# Patient Record
Sex: MALE | Race: AMERICAN INDIAN OR ALASKA NATIVE | URBAN - METROPOLITAN AREA
[De-identification: names, ages, dates, MRNs, and addresses within clinical notes are randomized per-mention and may not be internally consistent; named-entity substitution may affect disease eponyms.]

---

## 2017-06-28 ENCOUNTER — ALLIED HEALTH/NURSE VISIT (OUTPATIENT)
Dept: NEUROLOGY | Facility: CLINIC | Age: 52
End: 2017-06-28

## 2017-06-28 ENCOUNTER — OFFICE VISIT (OUTPATIENT)
Dept: NEUROLOGY | Facility: CLINIC | Age: 52
End: 2017-06-28

## 2017-06-28 VITALS — DIASTOLIC BLOOD PRESSURE: 89 MMHG | HEART RATE: 79 BPM | SYSTOLIC BLOOD PRESSURE: 143 MMHG

## 2017-06-28 DIAGNOSIS — R40.4 TRANSIENT ALTERATION OF AWARENESS: Primary | ICD-10-CM

## 2017-06-28 DIAGNOSIS — R56.9 SEIZURE-LIKE ACTIVITY (H): Primary | ICD-10-CM

## 2017-06-28 NOTE — MR AVS SNAPSHOT
After Visit Summary   2017    Berny Fuentes    MRN: [id number]           Patient Information     Date Of Birth          1965        Visit Information        Provider Department      2017 1:00 PM Berto Hogan MD [facility]        Today's Diagnoses     Transient alteration of awareness    -  1       Follow-ups after your visit        Follow-up notes from your care team     Return if symptoms worsen or fail to improve.      Who to contact     Please call your clinic at [phone] to:    Ask questions about your health    Make or cancel appointments    Discuss your medicines    Learn about your test results    Speak to your doctor   If you have compliments or concerns about an experience at your clinic, or if you wish to file a complaint, please contact [facility] Patient Relations at [phone] or email us at [email or web address]         Additional Information About Your Visit        MyChart Information     WritePatht is an electronic gateway that provides easy, online access to your medical records. With [employer], you can request a clinic appointment, read your test results, renew a prescription or communicate with your care team.     To sign up for WritePatht visit the website at [email or web address]   You will be asked to enter the access code listed below, as well as some personal information. Please follow the directions to create your username and password.     Your access code is: [email or web address]  Expires: 2017  6:10 PM     Your access code will  in 90 days. If you need help or a new code, please contact your [facility] Clinic or call [phone] for assistance.        Care EveryWhere ID     This is your Care EveryWhere ID. This could be used by other organizations to access your [facility] medical records  [id number]        Your Vitals Were     Pulse                   79            Blood  Pressure from Last 3 Encounters:   06/28/17 143/89    Weight from Last 3 Encounters:   No data found for Wt              Today, you had the following     No orders found for display       Primary Care Provider    None Specified       No primary provider on file.        Equal Access to Services     DEMETRIS KEMP : Hadii aad ku hadlorene Llanso, wakaylynda luqadaha, jevonta kaalmada adebayo, migel ramosin hayaaromero callahanmatheus fry macey harmon. So [facility] [phone].    ATENCIÓN: Si habla español, tiene a mccarthy disposición servicios gratuitos de asistencia lingüística. Llame al [phone].    We comply with applicable federal civil rights laws and [place] laws. We do not discriminate on the basis of race, color, national origin, age, disability sex, sexual orientation or gender identity.            Thank you!     Thank you for choosing [facility]  for your care. Our goal is always to provide you with excellent care. Hearing back from our patients is one way we can continue to improve our services. Please take a few minutes to complete the written survey that you may receive in the mail after your visit with us. Thank you!             Your Updated Medication List - Protect others around you: Learn how to safely use, store and throw away your medicines at www.disposemymeds.org.      Notice  As of 6/28/2017  8:19 PM    You have not been prescribed any medications.

## 2017-06-28 NOTE — MR AVS SNAPSHOT
After Visit Summary   2017    Berny Fuentes    MRN: [id number]           Patient Information     Date Of Birth          1965        Visit Information        Provider Department      2017 9:30 AM [facility] EEG 3 [facility]        Today's Diagnoses     Seizure-like activity (H)    -  1       Follow-ups after your visit        Who to contact     Please call your clinic at [phone] to:    Ask questions about your health    Make or cancel appointments    Discuss your medicines    Learn about your test results    Speak to your doctor   If you have compliments or concerns about an experience at your clinic, or if you wish to file a complaint, please contact [facility] Patient Relations at [phone] or email us at [email or web address]         Additional Information About Your Visit        MyChart Information     [employer] is an electronic gateway that provides easy, online access to your medical records. With [employer], you can request a clinic appointment, read your test results, renew a prescription or communicate with your care team.     To sign up for [employer] visit the website at [email or web address]   You will be asked to enter the access code listed below, as well as some personal information. Please follow the directions to create your username and password.     Your access code is: [email or web address]  Expires: 2017  6:10 PM     Your access code will  in 90 days. If you need help or a new code, please contact your [facility] or call [phone] for assistance.        Care EveryWhere ID     This is your Care EveryWhere ID. This could be used by other organizations to access your [place] medical records  [id number]         Blood Pressure from Last 3 Encounters:   17 143/89    Weight from Last 3 Encounters:   No data found for Wt              We Performed the Following     CHARGE: Video EEG   < 12 hours ([id number])     ORDER:  EEG video monitoring        Primary Care Provider    None Specified       No primary provider on file.        Equal Access to Services     DEMETRIS KEMP : Hadii aad ku hadcarenmónica Llanos, radha ojedahuiha, jevontristan holleyneilkash callahanemeraldkash, migel ramosin hayaaromero callahanmatheus fry laTomasatoni harmon. So [facility] [phone].    ATENCIÓN: Si habla español, tiene a mccarthy disposición servicios gratuitos de asistencia lingüística. Llame al [phone].    We comply with applicable federal civil rights laws and [place] laws. We do not discriminate on the basis of race, color, national origin, age, disability sex, sexual orientation or gender identity.            Thank you!     Thank you for choosing [facility]  for your care. Our goal is always to provide you with excellent care. Hearing back from our patients is one way we can continue to improve our services. Please take a few minutes to complete the written survey that you may receive in the mail after your visit with us. Thank you!             Your Updated Medication List - Protect others around you: Learn how to safely use, store and throw away your medicines at www.disposemymeds.org.      Notice  As of 6/28/2017  6:10 PM    You have not been prescribed any medications.

## 2017-06-28 NOTE — PROCEDURES
[facility] EEG #[id number] (Out-Patient Video-EEG Monitoring)    Name:     Berny Fuentes   MRN:      [id number]  :      1965  Procedure Date: 2017   Duration of Recordin hours, 44 minutes.     CLINICAL HISTORY:  This diagnostic video-EEG monitoring procedure is performed in evaluation of seizures in Berny Fuentes.  He was reported not to be receiving anti-seizure medication at the time of this recording.      TECHNICAL SUMMARY:  This continuous EEG monitoring procedure was performed with 23 scalp electrodes in 10-20 system placements, and additional scalp, precordial and other surface electrodes used for electrical referencing and artifact detection.  A single channel of EKG was recorded for purposes of analyzing EKG artifacts in the EEG channels.  Video monitoring was utilized and periodically reviewed by EEG technologist and the physician for electroclinical correlation.    INTERICTAL EEG ACTIVITIES:  During maximal waking, there was a symmetric, well-modulated, approximately 11 Hz posterior dominant rhythm, which was attenuated on eye opening.  Lower amplitude faster activities predominated anteriorly.  Drowsiness was manifested by predominance of centrally maximum semirhythmic theta slowing and dropout of the posterior dominant rhythm during deeper drowsiness.  Symmetric sleep spindles were seen during stage II sleep.  There was symmetric bilateral driving in response to photic stimulation.  Hyperventilation induced generalized theta slowing a brief frontal rhythmic delta activity transiently.   No interictal epileptiform abnormalities were recorded.    ICTAL RECORDINGS:  No electrographic seizures and no paroxysmal behavioral events occurred during this procedure.  He did report feeling dizzy during most of the recording.    SUMMARY OF VIDEO-EEG MONITORING:    The interictal recording was normal in waking, drowsiness and stage II sleep.  No pathological slowing, no interictal epileptiform  abnormalities, no electrographic seizures and no paroxysmal behavioral events were recorded during the period of monitoring.   Clinical correlation is recommended.   Berto Hogan M.D., Professor of Neurology

## 2017-06-28 NOTE — PROGRESS NOTES
[facility] [address]-30  OP/3hr Video EEG  [facility] - [place]  Dr. Edison villegas

## 2017-06-28 NOTE — LETTER
2017       RE: Berny Fuentes  : 1965   MRN: [id number]      Dear Colleague,    Thank you for referring your patient, Berny Fuentes, to the [facility] at [facility]. Please see a copy of my visit note below.        [facility]  Epilepsy Clinic:  NEW PATIENT EVALUATION    I spent 65 minutes in this patient care, more than 50% of which consisted of counseling and coordinating care.   Berto Hogan M.D.
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
